# Patient Record
Sex: FEMALE | Race: WHITE | NOT HISPANIC OR LATINO | Employment: STUDENT | RURAL
[De-identification: names, ages, dates, MRNs, and addresses within clinical notes are randomized per-mention and may not be internally consistent; named-entity substitution may affect disease eponyms.]

---
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
## 2025-07-28 ENCOUNTER — TELEPHONE (OUTPATIENT)
Dept: GENETICS | Facility: HOSPITAL | Age: 20
End: 2025-07-28

## 2025-07-28 NOTE — TELEPHONE ENCOUNTER
Called pt to remind her of her upcoming genetic counseling appt tomorrow. Reviewed family history with patient.

## 2025-07-29 ENCOUNTER — CLINICAL SUPPORT (OUTPATIENT)
Dept: GENETICS | Facility: HOSPITAL | Age: 20
End: 2025-07-29
Payer: MEDICAID

## 2025-07-29 DIAGNOSIS — Z13.79 GENETIC TESTING: Primary | ICD-10-CM

## 2025-07-29 DIAGNOSIS — Z80.3 FAMILY HISTORY OF BREAST CANCER: ICD-10-CM

## 2025-07-29 DIAGNOSIS — Z80.0 FAMILY HISTORY OF COLON CANCER: ICD-10-CM

## 2025-07-29 DIAGNOSIS — Z84.81 FAMILY HISTORY OF BRCA GENE POSITIVE: ICD-10-CM

## 2025-07-29 PROCEDURE — 96041 GENETIC COUNSELING SVC EA 30: CPT | Performed by: GENETIC COUNSELOR, MS

## 2025-07-29 NOTE — PROGRESS NOTES
Marianne Hernandez is a 19-year old female who was referred for genetic counseling due to a family history of breast cancer and reported familial BRCA mutation. Ms. Hernandez has no personal history of cancer. She was 9 years old at menarche and nulliparous. Her current cancer screening includes annual clinical breast exam. She has not yet had a colonoscopy. She is pre-menopausal, and she retains her uterus and ovaries. Ms. Hernandez was interested in discussing her risk for a hereditary cancer syndrome. Ms. Hernandez was interested in discussing her risk for a hereditary cancer syndrome and genetic testing options.    PERTINENT FAMILY HISTORY: (See attached pedigree)   Mother:   Breast cancer, 29      Breast cancer, 34   Reported BRCA+  Mat Great Grandmother: Cancer, unknown type  Pat Grandfather:  Lung cancer  Pat Grandmother:  Colon cancer    Records regarding the family history were not available for review.     RISK ASSESSMENT: Ms. Hernandez's family history of breast cancer as well as reported positive genetic testing in a relative raised the question of a hereditary cancer syndrome. Ms. Hernandez does meet NCCN guidelines criteria for BRCA1/2 testing with her mother's diagnosis of breast cancer under age 50. This risk assessment is based on the family history information provided at the time of the appointment. The assessment could change in the future should new information be obtained.    GENETIC COUNSELING: (30 minutes) We reviewed the family history information in detail.  Cases of cancer follow three general patterns: sporadic, familial, and hereditary. While most cancer is sporadic, some cases appear to occur in family clusters. These cases are said to be familial and account for 10-20% of breast cancer cases. Familial cases may be due to a combination of shared genes and environmental factors among family members. In even fewer families, the cancer is said to be inherited, and the genes responsible for the cancer are  known.      Family histories typical of hereditary cancer syndromes usually include multiple first- and second-degree relatives diagnosed with cancer types that define a syndrome. These cases tend to be diagnosed at younger-than-expected ages and can be bilateral or multifocal. The cancer in these families follows an autosomal dominant inheritance pattern, which indicates the likely presence of a mutation in a cancer susceptibility gene. Children and siblings of an individual believed to carry this mutation have a 50% chance of inheriting that mutation, thereby inheriting the increased risk to develop cancer. These mutations can be passed down from the maternal or the paternal lineage.     Hereditary breast cancer accounts for 5-10% of all cases of breast cancer.  A significant proportion of hereditary breast cancer can be attributed to mutations in the BRCA1 and BRCA2 genes. Mutations in these genes confer an increased risk for breast cancer, ovarian cancer, male breast cancer, prostate cancer and pancreatic cancer. Women with a BRCA2 mutation have up to an 84% risk of breast cancer, and up to a 27% risk of ovarian cancer. Additionally, there is as high as a 7% risk for male breast cancer and a 20% risk for prostate cancer. There are increased risks for pancreatic cancer and melanoma for individuals who have a BRCA2 mutation. There are other clinically significant breast cancer related genes in addition to BRCA1/2, including PALB2, DEAN, and CHEK2. There are also other, more rare, hereditary cancer syndromes.     GENETIC TESTING:  The risks, benefits and limitations of genetic testing and implications for clinical management following testing were reviewed. DNA test results can influence decisions regarding screening, prevention and surgical management. Genetic testing can have significant psychological implications for both individuals and families. Also discussed was the possibility of employment and insurance  discrimination based on genetic test results and the laws in place to prevent this (SERGIO), as well as the limitations of these laws.      We discussed panel testing, which would involve testing 77 genes associated with increased cancer risk. The implications of a positive or negative test result were discussed. We discussed the possibility that, in some cases, genetic test results may be ambiguous due to the identification of a genetic variant. These variants may or may not be associated with an increased cancer risk. With multigene panel testing, it is not uncommon for a variant of uncertain significance (VUS) to be identified. If a VUS is identified, testing family members is typically not recommended and screening recommendations are made based on the family history. The laboratories that perform genetic testing work to reclassify the VUS and send out an amended report if and when a VUS is reclassified. The majority of variant findings are ultimately reclassified to a negative result.     Total time spent caring for the patient today was 35 minutes. This time includes chart review, time spent during the visit, and time spent after the visit on documentation and follow-up.    PLAN: Ms. Hernandez decided not to pursue testing today. She is in the process of starting a new job and switching her insurance. She plans to contact me once her insurance is in place to proceed with testing.  If she has any questions in the meantime, she is welcome to call me at [phone].    Elisha Suh MS, [facility] [place]  Licensed Certified Genetic Counselor